# Patient Record
Sex: MALE | Race: WHITE | NOT HISPANIC OR LATINO | Employment: OTHER | URBAN - NONMETROPOLITAN AREA
[De-identification: names, ages, dates, MRNs, and addresses within clinical notes are randomized per-mention and may not be internally consistent; named-entity substitution may affect disease eponyms.]

---

## 2019-04-01 ENCOUNTER — HOSPITAL ENCOUNTER (EMERGENCY)
Facility: HOSPITAL | Age: 44
Discharge: HOME | End: 2019-04-02
Attending: FAMILY MEDICINE | Admitting: FAMILY MEDICINE

## 2019-04-01 DIAGNOSIS — K04.7 ABSCESSED TOOTH: Primary | ICD-10-CM

## 2019-04-01 PROCEDURE — 99282 EMERGENCY DEPT VISIT SF MDM: CPT | Mod: 25

## 2019-04-01 PROCEDURE — 64400 NJX AA&/STRD TRIGEMINAL NRV: CPT

## 2019-04-01 PROCEDURE — 64400 NJX AA&/STRD TRIGEMINAL NRV: CPT | Mod: Z6 | Performed by: FAMILY MEDICINE

## 2019-04-02 VITALS
HEART RATE: 66 BPM | DIASTOLIC BLOOD PRESSURE: 74 MMHG | TEMPERATURE: 97.4 F | OXYGEN SATURATION: 99 % | RESPIRATION RATE: 18 BRPM | SYSTOLIC BLOOD PRESSURE: 126 MMHG

## 2019-04-02 RX ORDER — CLINDAMYCIN HCL 150 MG
300 CAPSULE ORAL 3 TIMES DAILY
Qty: 60 CAPSULE | Refills: 0 | Status: SHIPPED | OUTPATIENT
Start: 2019-04-02 | End: 2022-08-09

## 2019-04-02 ASSESSMENT — ENCOUNTER SYMPTOMS
FATIGUE: 0
EYE REDNESS: 0
DIFFICULTY URINATING: 0
COLOR CHANGE: 0
HEADACHES: 0
CONFUSION: 0
SHORTNESS OF BREATH: 0
ARTHRALGIAS: 0
ABDOMINAL PAIN: 0
NECK STIFFNESS: 0

## 2019-04-02 NOTE — ED PROVIDER NOTES
"  History     Chief Complaint   Patient presents with     Dental Pain     HPI  Bernabe Garnica is a 43 year old man who presents with about 2 months of left lower dental pain that has increased over the last 3 days since he was eating and felt, \"like the chips I was chewing just collapsed the tooth inward.\" He reports consistent left lower tooth pain since that time. No fevers/chills. He took a single dose of ASA this afternoon, but nothing since that time.    Allergies:  No Known Allergies    Problem List:    There are no active problems to display for this patient.       Past Medical History:    No past medical history on file.    Past Surgical History:    No past surgical history on file.    Family History:    No family history on file.    Social History:  Marital Status:   [2]  Social History     Tobacco Use     Smoking status: Not on file   Substance Use Topics     Alcohol use: Not on file     Drug use: Not on file        Medications:      clindamycin (CLEOCIN) 150 MG capsule         Review of Systems   Constitutional: Negative for fatigue.   HENT: Negative for congestion.    Eyes: Negative for redness.   Respiratory: Negative for shortness of breath.    Cardiovascular: Negative for chest pain.   Gastrointestinal: Negative for abdominal pain.   Genitourinary: Negative for difficulty urinating.   Musculoskeletal: Negative for arthralgias and neck stiffness.   Skin: Negative for color change.   Neurological: Negative for headaches.   Psychiatric/Behavioral: Negative for confusion.       Physical Exam   BP: 121/70  Pulse: 63  Temp: 97.4  F (36.3  C)  Resp: 16  SpO2: 100 %      Physical Exam  General: awake, alert, sitting comfortably    Head: atraumatic.    Ears: no drainage, external ears normal.    Eyes: no injection or discharge, non-icteric.    Nose: no discharge or congestion.    Mouth/Throat: moist mucous membranes. Poor dentition with multiple missing teeth and multiple caries in remaining teeth. " Patient directs MD to abscessed tooth #18 as the source of his pain.    Neck: supple, full & painless ROM.    Lungs: no retractions or acute respiratory distress. No tachypnea.    Musculoskeletal/Extremities: full & painless ROM, no lower extremity edema or gross abnormality.    Skin: warm, dry, no cyanosis or rash.    Psych: alert & oriented x 3, fluid, logical thought & speech.      ED Course   Following verbal consent, 1.8 mL of 0.5% bupivacaine with epinephrine was used as a left inferior alveolar nerve block resulting in excellent analgesia. Patient tolerated procedure well.     Procedures                 No results found for this or any previous visit (from the past 24 hour(s)).    Medications - No data to display    Assessments & Plan (with Medical Decision Making)   The patient is a 43 year old man who presents with an abscessed tooth.    1) Abscessed tooth - patient will be treated with rest, alternating APAP/ibuprofen and a 10 day course of clindamycin to prevent further infection with plan for dentist follow-up as soon as possible regarding this ER visit. The patient verbalizes agreement with this plan as well as to immediately return to the ER with any new/worsening S/Sx.        I have reviewed the nursing notes.    I have reviewed the findings, diagnosis, plan and need for follow up with the patient.       Medication List      Started    clindamycin 150 MG capsule  Commonly known as:  CLEOCIN  300 mg, Oral, 3 TIMES DAILY            Final diagnoses:   Abscessed tooth       4/1/2019   [facility]     Christopher Haile MD  04/02/19 0023       Christopher Haile MD  04/02/19 0032

## 2019-04-02 NOTE — ED NOTES
Patient states he had been having dental pain on left lower side for a couple months.  Over the last 3 days, pain increased and he now has bleeding from his gums. States he hasn't been in to see a dentist and at this time, has no appointment scheduled.  Does state that tooth was previously filled and he has lost the filling and tooth also has back part of it missing.  States last thing he took was some aspirin this afternoon.

## 2019-04-02 NOTE — ED NOTES
Discharge instructions gone over with patient and he states understanding.  Patient is then discharged in stable condition, ambulatory.

## 2022-08-09 ENCOUNTER — NURSE TRIAGE (OUTPATIENT)
Dept: NURSING | Facility: CLINIC | Age: 47
End: 2022-08-09

## 2022-08-09 ENCOUNTER — HOSPITAL ENCOUNTER (EMERGENCY)
Facility: OTHER | Age: 47
Discharge: HOME | End: 2022-08-09
Attending: FAMILY MEDICINE | Admitting: FAMILY MEDICINE
Payer: COMMERCIAL

## 2022-08-09 VITALS
WEIGHT: 187 LBS | DIASTOLIC BLOOD PRESSURE: 86 MMHG | HEART RATE: 79 BPM | RESPIRATION RATE: 20 BRPM | OXYGEN SATURATION: 98 % | SYSTOLIC BLOOD PRESSURE: 135 MMHG | HEIGHT: 71 IN | TEMPERATURE: 98.9 F | BODY MASS INDEX: 26.18 KG/M2
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
## 2022-08-09 DIAGNOSIS — T14.8XXA PUNCTURE WOUND: ICD-10-CM

## 2022-08-09 PROCEDURE — 99283 EMERGENCY DEPT VISIT LOW MDM: CPT | Mod: 25 | Performed by: FAMILY MEDICINE

## 2022-08-09 PROCEDURE — 99283 EMERGENCY DEPT VISIT LOW MDM: CPT | Performed by: FAMILY MEDICINE

## 2022-08-09 PROCEDURE — 90471 IMMUNIZATION ADMIN: CPT | Performed by: FAMILY MEDICINE

## 2022-08-09 PROCEDURE — 250N000011 HC RX IP 250 OP 636: Performed by: FAMILY MEDICINE

## 2022-08-09 PROCEDURE — 90715 TDAP VACCINE 7 YRS/> IM: CPT | Performed by: FAMILY MEDICINE

## 2022-08-09 RX ORDER — CEPHALEXIN 500 MG/1
500 CAPSULE ORAL 4 TIMES DAILY
Qty: 28 CAPSULE | Refills: 0 | Status: SHIPPED | OUTPATIENT
Start: 2022-08-09 | End: 2022-08-16

## 2022-08-09 RX ADMIN — TETANUS TOXOID, REDUCED DIPHTHERIA TOXOID AND ACELLULAR PERTUSSIS VACCINE, ADSORBED 0.5 ML: 5; 2.5; 8; 8; 2.5 SUSPENSION INTRAMUSCULAR at 16:31

## 2022-08-09 ASSESSMENT — ACTIVITIES OF DAILY LIVING (ADL): ADLS_ACUITY_SCORE: 35

## 2022-08-09 NOTE — ED PROVIDER NOTES
"  History     Chief Complaint   Patient presents with     Snake Bite     HPI  Bernabe Garnica is a 46 year old male who presents emergency department after he was bitten by a nonvenomous snake.  Patient is well versed in the identification of a snake.  He bled a little bit at the time, only concerned about preventing infection.    Reviewed nurses notes below, similar history is related to me.  Patient presents to ER after a snake bite today by a red bellied eastern dailey snake on R index finger. 2 bites. Patient is anxious and fidgeting. Reports sore throat after bite and lightheadedness that has dissipated.  Concerned about infection.   Allergies:  Allergies   Allergen Reactions     Amoxicillin GI Disturbance       Problem List:    There are no problems to display for this patient.       Past Medical History:    No past medical history on file.    Past Surgical History:    No past surgical history on file.    Family History:    No family history on file.    Social History:  Marital Status:   [2]        Medications:    cephALEXin (KEFLEX) 500 MG capsule          Review of Systems   Constitutional: Negative for chills and fever.   Respiratory: Negative for chest tightness and shortness of breath.        Physical Exam   BP: 135/86  Pulse: 79  Temp: 98.9  F (37.2  C)  Resp: 20  Height: 180.3 cm (5' 11\")  Weight: 84.8 kg (187 lb)  SpO2: 98 %      Physical Exam  Vitals and nursing note reviewed.   HENT:      Head: Normocephalic.   Cardiovascular:      Rate and Rhythm: Normal rate.   Pulmonary:      Effort: No respiratory distress.      Breath sounds: No wheezing or rales.   Musculoskeletal:      Cervical back: Normal range of motion.       2 small puncture wounds, hemostatic  ED Course          No results found for this or any previous visit (from the past 24 hour(s)).    Medications   Tdap (tetanus-diptheria-acell pertussis) (BOOSTRIX) injection ADAM 0.5 mL (0.5 mLs Intramuscular Given 8/9/22 6452) "       Assessments & Plan (with Medical Decision Making)     I have reviewed the nursing notes.    I have reviewed the findings, diagnosis, plan and need for follow up with the patient.  I am not concerned about envenomation, we will treat this just like a single puncture wound, tetanus updated, prophylactic antibiotics.  Return to ED with signs of infection such as fever, worsening pain, discharge or swelling.  Patient verbalized understanding plans agreement left ED improved condition.    New Prescriptions    CEPHALEXIN (KEFLEX) 500 MG CAPSULE    Take 1 capsule (500 mg) by mouth 4 times daily for 7 days       Final diagnoses:   Puncture wound   Snake bite, initial encounter       8/9/2022   [facility]  08/10/22 6170

## 2022-08-09 NOTE — TELEPHONE ENCOUNTER
"Wife calling - patient was bit by a red-sided dailey snake about 30 min ago    2 puncture holes on right index knuckle and 1/2in higher on index finger    Patient tried to let the bite bleed a bit and then cleaned it with hydrogen peroxide.    No breathing difficulty or swelling of airway. States he is dehydrated and has \"cotton mouth\". Advised getting out of heat and hydrating.    No redness, no swelling, no fever.  Advised patient clean the area with soap and water and apply topical antibiotic. Call back with any increased pain, swelling, redness, difficulty breathing, fever, etc.    Patient verbalized understanding and agreed with plan.     Amy Aponte RN  08/09/22 3:10 PM  [facility] Nurse Advisor      Reason for Disposition    Non-serious spider bite    Additional Information    Negative: Difficulty breathing or swallowing    Negative: Difficult to awaken or acting confused (e.g., disoriented, slurred speech)    Negative: Pale cold skin and very weak (can't stand)    Negative: Sounds like a life-threatening emergency to the triager    Negative: Not a spider bite    Negative: Black  (or brown ) spider bite and local skin changes    Negative: Abdominal pain, chest tightness, or other muscle cramps    Negative: Urine is brown, black, or red in color    Negative: Vomiting    Negative: Patient sounds very sick or weak to the triager    Negative: SEVERE bite pain and not improved after 2 hours of pain medicine    Negative: Rash elsewhere on body that developed after spider bite    Negative: Fever and red area    Negative: Fever and area is very tender to touch    Negative: Red streak or red line and length > 2 inches (5 cm)    Negative: Red or very tender (to touch) area, and started over 24 hours after the bite    Negative: Red or very tender (to touch) area, getting larger over 48 hours after the bite    Negative: Eye irritation after handling or touching a tarantula    Negative: Has diabetes " mellitus with spider bite wound on foot    Negative: No prior tetanus shots (or is not fully vaccinated)    Negative: Patient wants to be seen    Negative: Scab drains pus or increases in size, and not improved after applying antibiotic ointment for 2 days    Negative: Bite starts to look bad (e.g., blister, purplish skin, ulcer)    Negative: Last tetanus shot > 10 years ago    Protocols used: SPIDER BITE [facility]

## 2022-08-09 NOTE — ED TRIAGE NOTES
"Patient presents to ER after a snake bite today by a red bellied eastern dailey snake on R index finger. 2 bites. Patient is anxious and fidgeting. Reports sore throat after bite and lightheadedness that has dissipated.  Concerned about infection.  /86   Pulse 79   Temp 98.9  F (37.2  C) (Temporal)   Resp 20   Ht 1.803 m (5' 11\")   Wt 84.8 kg (187 lb)   BMI 26.08 kg/m         Triage Assessment     Row Name 08/09/22 4329       Triage Assessment (Adult)    Airway WDL WDL       Respiratory WDL    Respiratory WDL WDL       Skin Circulation/Temperature WDL    Skin Circulation/Temperature WDL WDL       Cardiac WDL    Cardiac WDL WDL       Peripheral/Neurovascular WDL    Peripheral Neurovascular WDL WDL    Capillary Refill, General less than/equal to 3 secs       Neil Coma Scale    Best Eye Response 4-->(E4) spontaneous    Best Motor Response 6-->(M6) obeys commands    Best Verbal Response 5-->(V5) oriented    Neil Coma Scale Score 15              "

## 2022-08-10 ASSESSMENT — ENCOUNTER SYMPTOMS
CHEST TIGHTNESS: 0
SHORTNESS OF BREATH: 0
FEVER: 0
CHILLS: 0

## 2022-08-23 ENCOUNTER — OFFICE VISIT (OUTPATIENT)
Dept: FAMILY MEDICINE | Facility: OTHER | Age: 47
End: 2022-08-23
Attending: FAMILY MEDICINE
Payer: COMMERCIAL

## 2022-08-23 ENCOUNTER — TELEPHONE (OUTPATIENT)
Dept: FAMILY MEDICINE | Facility: OTHER | Age: 47
End: 2022-08-23

## 2022-08-23 VITALS
TEMPERATURE: 96.9 F | WEIGHT: 190.8 LBS | DIASTOLIC BLOOD PRESSURE: 78 MMHG | HEIGHT: 71 IN | SYSTOLIC BLOOD PRESSURE: 120 MMHG | HEART RATE: 62 BPM | RESPIRATION RATE: 18 BRPM | BODY MASS INDEX: 26.71 KG/M2 | OXYGEN SATURATION: 97 %

## 2022-08-23 DIAGNOSIS — F50.89 ATYPICAL EATING DISORDER: ICD-10-CM

## 2022-08-23 DIAGNOSIS — Z02.89 HEALTH EXAMINATION OF DEFINED SUBPOPULATION: Primary | ICD-10-CM

## 2022-08-23 DIAGNOSIS — F10.11 HISTORY OF ALCOHOL ABUSE: ICD-10-CM

## 2022-08-23 DIAGNOSIS — F11.11 HX OF OPIOID ABUSE (H): ICD-10-CM

## 2022-08-23 DIAGNOSIS — M17.0 PRIMARY OSTEOARTHRITIS OF BOTH KNEES: ICD-10-CM

## 2022-08-23 LAB
CHOLEST SERPL-MCNC: 239 MG/DL
FASTING STATUS PATIENT QL REPORTED: NO
HDLC SERPL-MCNC: 40 MG/DL (ref 23–92)
LDLC SERPL CALC-MCNC: 150 MG/DL
NONHDLC SERPL-MCNC: 199 MG/DL
TRIGL SERPL-MCNC: 245 MG/DL

## 2022-08-23 PROCEDURE — 80061 LIPID PANEL: CPT | Mod: ZL | Performed by: FAMILY MEDICINE

## 2022-08-23 PROCEDURE — 99386 PREV VISIT NEW AGE 40-64: CPT | Performed by: FAMILY MEDICINE

## 2022-08-23 PROCEDURE — 36415 COLL VENOUS BLD VENIPUNCTURE: CPT | Mod: ZL | Performed by: FAMILY MEDICINE

## 2022-08-23 ASSESSMENT — ENCOUNTER SYMPTOMS
MYALGIAS: 1
SORE THROAT: 0
ARTHRALGIAS: 1
FREQUENCY: 1
SHORTNESS OF BREATH: 1
HEMATURIA: 0
HEARTBURN: 1
WEAKNESS: 1
DIZZINESS: 1
HEADACHES: 1
NAUSEA: 1
PARESTHESIAS: 1
ABDOMINAL PAIN: 0
JOINT SWELLING: 1
DYSURIA: 0
NERVOUS/ANXIOUS: 1
FEVER: 0
PALPITATIONS: 0
COUGH: 0
CONSTIPATION: 0
CHILLS: 0
DIARRHEA: 0
HEMATOCHEZIA: 0

## 2022-08-23 ASSESSMENT — PAIN SCALES - GENERAL: PAINLEVEL: SEVERE PAIN (7)

## 2022-08-23 ASSESSMENT — ANXIETY QUESTIONNAIRES
GAD7 TOTAL SCORE: 0
5. BEING SO RESTLESS THAT IT IS HARD TO SIT STILL: NOT AT ALL
7. FEELING AFRAID AS IF SOMETHING AWFUL MIGHT HAPPEN: NOT AT ALL
GAD7 TOTAL SCORE: 0
1. FEELING NERVOUS, ANXIOUS, OR ON EDGE: NOT AT ALL
3. WORRYING TOO MUCH ABOUT DIFFERENT THINGS: NOT AT ALL
6. BECOMING EASILY ANNOYED OR IRRITABLE: NOT AT ALL
2. NOT BEING ABLE TO STOP OR CONTROL WORRYING: NOT AT ALL

## 2022-08-23 ASSESSMENT — PATIENT HEALTH QUESTIONNAIRE - PHQ9: 5. POOR APPETITE OR OVEREATING: NOT AT ALL

## 2022-08-23 NOTE — TELEPHONE ENCOUNTER
Patient states he forgot to talk to Dr. Leon about pus in his tonsils. Please call.    Kenzie Pratt on 8/23/2022 at 1:28 PM

## 2022-08-23 NOTE — LETTER
August 24, 2022      Bernabe Garnica  [address] [facility] [address]        Dear ,    We are writing to inform you of your test results.    Your test results fall within the expected range(s) or remain unchanged from previous results.  Please continue with current treatment plan.   The 10-year ASCVD risk score (Katheryn JAMA Jr., et al., 2013) is: 4.2%    Values used to calculate the score:      Age: 46 years      Sex: Male      Is Non- : No      Diabetic: No      Tobacco smoker: No      Systolic Blood Pressure: 120 mmHg      Is BP treated: No      HDL Cholesterol: 40 mg/dL      Total Cholesterol: 239 mg/dL      Resulted Orders   Lipid Panel   Result Value Ref Range    Cholesterol 239 (H) <200 mg/dL    Triglycerides 245 (H) <150 mg/dL    Direct Measure HDL 40 23 - 92 mg/dL    LDL Cholesterol Calculated 150 (H) <=100 mg/dL    Non HDL Cholesterol 199 (H) <130 mg/dL    Patient Fasting > 8hrs? No     Narrative    Cholesterol  Desirable:  <200 mg/dL    Triglycerides  Normal:  Less than 150 mg/dL  Borderline High:  150-199 mg/dL  High:  200-499 mg/dL  Very High:  Greater than or equal to 500 mg/dL    Direct Measure HDL  Female:  Greater than or equal to 50 mg/dL   Male:  Greater than or equal to 40 mg/dL    LDL Cholesterol  Desirable:  <100mg/dL  Above Desirable:  100-129 mg/dL   Borderline High:  130-159 mg/dL   High:  160-189 mg/dL   Very High:  >= 190 mg/dL    Non HDL Cholesterol  Desirable:  130 mg/dL  Above Desirable:  130-159 mg/dL  Borderline High:  160-189 mg/dL  High:  190-219 mg/dL  Very High:  Greater than or equal to 220 mg/dL       If you have any questions or concerns, please call the clinic at the number listed above.       Sincerely,      Greg Leon MD

## 2022-08-23 NOTE — NURSING NOTE
Patient here for physical, last eye exam 2003 and last dental exam 2021. Medication Reconciliation: complete.    Missy Dhillon LPN  8/23/2022 10:09 AM

## 2022-08-23 NOTE — TELEPHONE ENCOUNTER
He says the area  Is shrinking and no pain, no fever, he said he slept with his mouth open and it started to hurt. He did salt water gargles and it took the sting out it along with using Dayquil. It started about 2 weeks ago. Please advise if he should continue what he has been doing. Missy Dhillon LPN .......................8/23/2022  4:06 PM

## 2022-08-23 NOTE — PROGRESS NOTES
"  Assessment & Plan     Health examination of defined subpopulation    - Lipid Panel; Future  - Lipid Panel    Atypical eating disorder      Primary osteoarthritis of both knees      Hx of opioid abuse (H)  Currently doing well in remission    History of alcohol abuse  Currently doing well in remission    We will get back to patient when the labs with return.           BMI:   Estimated body mass index is 26.99 kg/m  as calculated from the following:    Height as of this encounter: 1.791 m (5' 10.5\").    Weight as of this encounter: 86.5 kg (190 lb 12.8 oz).           No follow-ups on file.    Greg Leon MD  [facility] AND [facility]    Subjective   Bernabe is a 46 year old, presenting for the following health issues:  Physical      Patient arrives here for physical patient [facility] the [facility] has been [facility].  He reports he has been in the  for 15 years.  He had difficulty in the  with alcohol and opiates.  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  He reports shoulder pain.  He reports pain 7 out of 10.  He states that his 100% disabled.  His past medical history is updated.    Healthy Habits:     Getting at least 3 servings of Calcium per day:  NO    Bi-annual eye exam:  NO    Dental care twice a year:  NO    Sleep apnea or symptoms of sleep apnea:  None    Diet:  Other    Frequency of exercise:  1 day/week    Duration of exercise:  15-30 minutes    Taking medications regularly:  No    Barriers to taking medications:  Other    Medication side effects:  Other    PHQ-2 Total Score: 0    Additional concerns today:  No             Review of Systems         Objective    /78   Pulse 62   Temp 96.9  F (36.1  C)   Resp 18   Ht 1.791 m (5' 10.5\")   Wt 86.5 kg (190 lb 12.8 oz)   SpO2 97%   BMI 26.99 kg/m    Body mass index is 26.99 kg/m .  Physical Exam  Constitutional:       Appearance: " Normal appearance.   HENT:      Head: Normocephalic.      Mouth/Throat:      Mouth: Mucous membranes are moist.   Cardiovascular:      Rate and Rhythm: Normal rate and regular rhythm.   Abdominal:      General: Abdomen is flat.   Skin:     General: Skin is warm.   Neurological:      General: No focal deficit present.      Mental Status: He is alert.   Psychiatric:         Mood and Affect: Mood normal.         Thought Content: Thought content normal.                            .  ..

## 2022-09-26 ENCOUNTER — OFFICE VISIT (OUTPATIENT)
Dept: FAMILY MEDICINE | Facility: OTHER | Age: 47
End: 2022-09-26
Attending: FAMILY MEDICINE
Payer: COMMERCIAL

## 2022-09-26 VITALS
WEIGHT: 191.2 LBS | OXYGEN SATURATION: 98 % | RESPIRATION RATE: 18 BRPM | DIASTOLIC BLOOD PRESSURE: 72 MMHG | BODY MASS INDEX: 27.05 KG/M2 | TEMPERATURE: 97.3 F | HEART RATE: 62 BPM | SYSTOLIC BLOOD PRESSURE: 114 MMHG

## 2022-09-26 DIAGNOSIS — Z53.20 COLON CANCER SCREENING DECLINED: ICD-10-CM

## 2022-09-26 DIAGNOSIS — K21.9 GASTROESOPHAGEAL REFLUX DISEASE WITHOUT ESOPHAGITIS: ICD-10-CM

## 2022-09-26 DIAGNOSIS — J31.2 CHRONIC SORE THROAT: ICD-10-CM

## 2022-09-26 PROCEDURE — 99213 OFFICE O/P EST LOW 20 MIN: CPT | Performed by: FAMILY MEDICINE

## 2022-09-26 RX ORDER — OMEPRAZOLE 40 MG/1
40 CAPSULE, DELAYED RELEASE ORAL DAILY
Qty: 90 CAPSULE | Refills: 0 | Status: SHIPPED | OUTPATIENT
Start: 2022-09-26 | End: 2023-01-06

## 2022-09-26 ASSESSMENT — ANXIETY QUESTIONNAIRES
7. FEELING AFRAID AS IF SOMETHING AWFUL MIGHT HAPPEN: NOT AT ALL
2. NOT BEING ABLE TO STOP OR CONTROL WORRYING: NOT AT ALL
GAD7 TOTAL SCORE: 0
6. BECOMING EASILY ANNOYED OR IRRITABLE: NOT AT ALL
8. IF YOU CHECKED OFF ANY PROBLEMS, HOW DIFFICULT HAVE THESE MADE IT FOR YOU TO DO YOUR WORK, TAKE CARE OF THINGS AT HOME, OR GET ALONG WITH OTHER PEOPLE?: NOT DIFFICULT AT ALL
1. FEELING NERVOUS, ANXIOUS, OR ON EDGE: NOT AT ALL
IF YOU CHECKED OFF ANY PROBLEMS ON THIS QUESTIONNAIRE, HOW DIFFICULT HAVE THESE PROBLEMS MADE IT FOR YOU TO DO YOUR WORK, TAKE CARE OF THINGS AT HOME, OR GET ALONG WITH OTHER PEOPLE: NOT DIFFICULT AT ALL
3. WORRYING TOO MUCH ABOUT DIFFERENT THINGS: NOT AT ALL
5. BEING SO RESTLESS THAT IT IS HARD TO SIT STILL: NOT AT ALL
7. FEELING AFRAID AS IF SOMETHING AWFUL MIGHT HAPPEN: NOT AT ALL
GAD7 TOTAL SCORE: 0
GAD7 TOTAL SCORE: 0
4. TROUBLE RELAXING: NOT AT ALL

## 2022-09-26 ASSESSMENT — PATIENT HEALTH QUESTIONNAIRE - PHQ9
SUM OF ALL RESPONSES TO PHQ QUESTIONS 1-9: 3
SUM OF ALL RESPONSES TO PHQ QUESTIONS 1-9: 3

## 2022-09-26 ASSESSMENT — PAIN SCALES - GENERAL: PAINLEVEL: NO PAIN (1)

## 2022-09-26 NOTE — PROGRESS NOTES
"  Assessment & Plan     Chronic sore throat  I suspect is GERD we will start  - omeprazole (PRILOSEC) 40 MG DR capsule; Take 1 capsule (40 mg) by mouth daily    Gastroesophageal reflux disease without esophagitis    - omeprazole (PRILOSEC) 40 MG DR capsule; Take 1 capsule (40 mg) by mouth daily    Colon cancer screening declined  Declined colon cancer screening and Cologuard    Also discussed his dizziness.  If worsening follow-up in clinic           BMI:   Estimated body mass index is 27.05 kg/m  as calculated from the following:    Height as of 8/23/22: 1.791 m (5' 10.5\").    Weight as of this encounter: 86.7 kg (191 lb 3.2 oz).           No follow-ups on file.    Greg Leon MD  [facility]    Greyson Kidd is a 47 year old, presenting for the following health issues:  Throat Problem      Patient arrives here with chronic sore throat.  He states he has been having heartburn on and off for years.  Recently he developed a sore throat.  Started in August.  Has been using Tums with maybe little relief.  He also has concerns about dizziness.  He states he was on a fairly restricted diet.  But did finally admit and the dizziness has improved.  Lightheaded.  He did not check his pulse.  Or blood pressure.  Dizziness is not present all the time.  After starting to eat meat again seem to improve    History of Present Illness       Reason for visit:  Pain in throat along with some heartburn    He eats 0-1 servings of fruits and vegetables daily.He consumes 2 sweetened beverage(s) daily.He exercises with enough effort to increase his heart rate 9 or less minutes per day.  He exercises with enough effort to increase his heart rate 3 or less days per week.   He is taking medications regularly.    Today's PHQ-9         PHQ-9 Total Score: 3    PHQ-9 Q9 Thoughts of better off dead/self-harm past 2 weeks :   Not at all      Today's MITCHEL-7 Score: 0             Review of Systems         Objective  "   /72   Pulse 62   Temp 97.3  F (36.3  C)   Resp 18   Wt 86.7 kg (191 lb 3.2 oz)   SpO2 98%   BMI 27.05 kg/m    Body mass index is 27.05 kg/m .  Physical Exam  Constitutional:       Appearance: Normal appearance.   HENT:      Right Ear: Tympanic membrane normal.      Left Ear: Tympanic membrane normal.      Mouth/Throat:      Mouth: Mucous membranes are moist.      Pharynx: No oropharyngeal exudate or posterior oropharyngeal erythema.      Comments: Palpation of the floor the mouth did not reveal any masses.  Cardiovascular:      Rate and Rhythm: Normal rate and regular rhythm.      Pulses: Normal pulses.   Pulmonary:      Effort: Pulmonary effort is normal.      Breath sounds: Normal breath sounds.   Neurological:      Mental Status: He is alert.

## 2022-09-26 NOTE — NURSING NOTE
Patient here to have his throat checked. Since August 2022 he has had a persistent soreness. Medication Reconciliation: complete.    Missy Dhillon LPN  9/26/2022 10:52 AM  
Spine appears normal, range of motion is not limited, no muscle or joint tenderness

## 2023-01-03 DIAGNOSIS — K21.9 GASTROESOPHAGEAL REFLUX DISEASE WITHOUT ESOPHAGITIS: ICD-10-CM

## 2023-01-03 DIAGNOSIS — J31.2 CHRONIC SORE THROAT: ICD-10-CM

## 2023-01-06 RX ORDER — OMEPRAZOLE 40 MG/1
40 CAPSULE, DELAYED RELEASE ORAL DAILY
Qty: 90 CAPSULE | Refills: 0 | Status: SHIPPED | OUTPATIENT
Start: 2023-01-06

## 2023-01-06 NOTE — TELEPHONE ENCOUNTER
"  Last Prescription Date: 9/26/22  Last Qty/Refills: 90 / 0  Last Office Visit: 9/26/22  Future Office Visit: none noted     Requested Prescriptions   Pending Prescriptions Disp Refills     omeprazole (PRILOSEC) 40 MG DR capsule [Pharmacy Med Name: OMEPRAZOLE 40MG DR CAPSULE] 90 capsule 0     Sig: TAKE 1 CAPSULE (40 MG) BY MOUTH DAILY       PPI Protocol Passed - 1/3/2023 11:57 AM        Passed - Not on Clopidogrel (unless Pantoprazole ordered)        Passed - No diagnosis of osteoporosis on record        Passed - Recent (12 mo) or future (30 days) visit within the authorizing provider's specialty     Patient has had an office visit with the authorizing provider or a provider within the authorizing providers department within the previous 12 mos or has a future within next 30 days. See \"Patient Info\" tab in inbasket, or \"Choose Columns\" in Meds & Orders section of the refill encounter.          Passed - Medication is active on med list        Passed - Patient is age 18 or older   Lisa Fuentes RN ....................  1/6/2023   9:09 AM          "

## 2023-08-28 ENCOUNTER — HOSPITAL ENCOUNTER (EMERGENCY)
Facility: OTHER | Age: 48
Discharge: HOME | End: 2023-08-28
Payer: COMMERCIAL

## 2023-08-28 VITALS
RESPIRATION RATE: 16 BRPM | DIASTOLIC BLOOD PRESSURE: 75 MMHG | WEIGHT: 191 LBS | BODY MASS INDEX: 26.74 KG/M2 | TEMPERATURE: 98.2 F | HEIGHT: 71 IN | SYSTOLIC BLOOD PRESSURE: 135 MMHG | HEART RATE: 62 BPM | OXYGEN SATURATION: 96 %

## 2023-08-28 NOTE — ED TRIAGE NOTES
Pt presents to ED with c/o bright red blood in his stool. Pt is on malaria medication from [place]. Pt reports diarrhea side effect from that. Pt c/o feeling ligh headed. Pt has sinus pressure as well. Bleeding X2 days.    Fay Workman RN on 8/28/2023 at 1:18 PM       Triage Assessment       Row Name 08/28/23 1316       Skin Circulation/Temperature WDL    Skin Circulation/Temperature WDL WDL                    
no concerns

## 2024-01-08 ENCOUNTER — HOSPITAL ENCOUNTER (EMERGENCY)
Facility: OTHER | Age: 49
Discharge: HOME | End: 2024-01-08
Attending: EMERGENCY MEDICINE | Admitting: EMERGENCY MEDICINE
Payer: COMMERCIAL

## 2024-01-08 VITALS
HEART RATE: 65 BPM | DIASTOLIC BLOOD PRESSURE: 83 MMHG | HEIGHT: 71 IN | TEMPERATURE: 98.8 F | WEIGHT: 191 LBS | RESPIRATION RATE: 18 BRPM | OXYGEN SATURATION: 96 % | BODY MASS INDEX: 26.74 KG/M2 | SYSTOLIC BLOOD PRESSURE: 133 MMHG

## 2024-01-08 DIAGNOSIS — J02.0 ACUTE STREPTOCOCCAL PHARYNGITIS: ICD-10-CM

## 2024-01-08 LAB
FLUAV RNA SPEC QL NAA+PROBE: NEGATIVE
FLUBV RNA RESP QL NAA+PROBE: NEGATIVE
GROUP A STREP BY PCR: DETECTED
RSV RNA SPEC NAA+PROBE: NEGATIVE
SARS-COV-2 RNA RESP QL NAA+PROBE: NEGATIVE

## 2024-01-08 PROCEDURE — 87637 SARSCOV2&INF A&B&RSV AMP PRB: CPT | Performed by: EMERGENCY MEDICINE

## 2024-01-08 PROCEDURE — 87651 STREP A DNA AMP PROBE: CPT | Performed by: EMERGENCY MEDICINE

## 2024-01-08 PROCEDURE — 99283 EMERGENCY DEPT VISIT LOW MDM: CPT | Performed by: EMERGENCY MEDICINE

## 2024-01-08 RX ORDER — CEFUROXIME AXETIL 250 MG/1
250 TABLET ORAL 2 TIMES DAILY
Qty: 20 TABLET | Refills: 0 | Status: SHIPPED | OUTPATIENT
Start: 2024-01-08 | End: 2024-01-18

## 2024-01-08 ASSESSMENT — ACTIVITIES OF DAILY LIVING (ADL): ADLS_ACUITY_SCORE: 33

## 2024-01-08 NOTE — ED PROVIDER NOTES
"[facility]  Emergency Department Visit Note    Pharyngitis      History of Present Illness     HPI:  Bernabe Garnica is a 48 year old male presenting with sore throat. These symptoms started 1 days ago and have progressively worsened. The patient has been exposed to sick contacts with similar symptoms. . The patient is able to tolerate fluids but has pain with swallowing. No pain with breathing, shortness of breath, or sensation of restricted  Breathing. No change in voice. Vaccinations are up to date. He has tried tylenol without significant relief of symptoms.    Medications:  Prior to Admission medications    Medication Sig Last Dose Taking? Auth Provider Long Term End Date   cefuroxime (CEFTIN) 250 MG tablet Take 1 tablet (250 mg) by mouth 2 times daily for 10 days  Yes Estefany Stone MD  1/18/24   omeprazole (PRILOSEC) 40 MG DR capsule TAKE 1 CAPSULE (40 MG) BY MOUTH DAILY More than a month Yes Greg Leon MD         Allergies:  Allergies   Allergen Reactions    Amoxicillin GI Disturbance       Problem List:  Patient Active Problem List   Diagnosis    Atypical eating disorder    Primary osteoarthritis of both knees    Hx of opioid abuse (H)    History of alcohol abuse    Chronic sore throat    Gastroesophageal reflux disease without esophagitis    Colon cancer screening declined       Past Medical History:  No past medical history on file.    Past Surgical History:  No past surgical history on file.    Social History:  Social History     Tobacco Use    Smoking status: Former    Smokeless tobacco: Never   Vaping Use    Vaping Use: Never used   Substance Use Topics    Alcohol use: Yes     Comment: less than weekly    Drug use: Never       Review of Systems:  See HPI      Physical Exam     Vital signs: /83   Pulse 65   Temp 98.8  F (37.1  C) (Tympanic)   Resp 18   Ht 1.791 m (5' 10.5\")   Wt 86.6 kg (191 lb)   SpO2 96%   BMI 27.02 kg/m      Physical Exam:    General: " awake and alert, comfortable  HEENT: atraumatic, no scleral injection, no nasal discharge, neck supple with tenderness of bilateral neck below the mandible and no tenderness with manipulation of the cricoid or thyroid cartilages, oropharynx notable for erythema and tonsils 2* without exudates. Trismus is not present. Muffled voice is not present. Uvular shift is not present.   Chest: clear to auscultation bilaterally without wheezes or crackles, non labored respirations, symmetric chest rise  Cardiovascular: regular rate and rhythm, no murmurs or gallops  Abdomen: soft, nontender, no rebound or guarding, nondistended  Extremities: no deformities, edema, or tenderness  Skin: warm, dry, no rashes  Neuro: alert and oriented x 3, moving extremities x 4, ambulates without difficulty    Medical Decision Making & ED Course     Bernabe Garnica is a 48 year old male presenting with sore throat. Differential includes viral pharyngitis, streptococcal pharyngitis, peritonsillar abscess, retropharyngeal abscess, epiglottitis, allergy, gastroesophageal reflux disease. With the constellation of symptoms and exam findings in context of the [employer] criteria, a rapid streptococcal antigen test was positive. Given exam findings, this is unlikely to be a peritonsillar or retropharyngeal abscess or epiglottitis. Dexamethasone was not given for symptomatic relief.  Patient likely has strep pharyngitis as etiology of his sore throat and will prescribe ceftin for outpatient therapy. Return to the ER with increased pain, inability to swallow fluids, fever, rash or any concerns. Follow up with a primary care provider if symptoms do not improve within the next 2-4 days. All questions were answered and the patient is comfortable with plan for discharge. The patient was discharged in stable condition.    I have reviewed the patients laboratory studies and medical records.  .    Diagnosis & Disposition     Diagnosis:  1. Acute streptococcal  pharyngitis        Disposition:  Home    MD Chase Mandel Theresa M, MD  01/08/24 3679

## 2024-01-08 NOTE — ED TRIAGE NOTES
"Pt presents to ED with c/o sore throat \"with pss pockets\". Pt does not know if he has exposure to strep. Pt is afebrile.    Fay Workman RN on 1/8/2024 at 3:57 PM       Triage Assessment (Adult)       Row Name 01/08/24 1556          Respiratory WDL    Respiratory WDL WDL        Peripheral/Neurovascular WDL    Peripheral Neurovascular WDL WDL        Cognitive/Neuro/Behavioral WDL    Cognitive/Neuro/Behavioral WDL WDL                     "

## 2024-02-10 ENCOUNTER — HOSPITAL ENCOUNTER (EMERGENCY)
Facility: OTHER | Age: 49
End: 2024-02-10

## 2024-02-11 ENCOUNTER — HOSPITAL ENCOUNTER (EMERGENCY)
Facility: OTHER | Age: 49
Discharge: HOME | End: 2024-02-11
Attending: INTERNAL MEDICINE | Admitting: INTERNAL MEDICINE
Payer: COMMERCIAL

## 2024-02-11 VITALS
OXYGEN SATURATION: 97 % | BODY MASS INDEX: 24.5 KG/M2 | WEIGHT: 175 LBS | TEMPERATURE: 98.6 F | RESPIRATION RATE: 18 BRPM | HEIGHT: 71 IN | DIASTOLIC BLOOD PRESSURE: 59 MMHG | HEART RATE: 78 BPM | SYSTOLIC BLOOD PRESSURE: 121 MMHG

## 2024-02-11 DIAGNOSIS — J02.9 ACUTE PHARYNGITIS, UNSPECIFIED ETIOLOGY: ICD-10-CM

## 2024-02-11 LAB — GROUP A STREP BY PCR: NOT DETECTED

## 2024-02-11 PROCEDURE — 99283 EMERGENCY DEPT VISIT LOW MDM: CPT | Performed by: INTERNAL MEDICINE

## 2024-02-11 PROCEDURE — 87651 STREP A DNA AMP PROBE: CPT | Performed by: INTERNAL MEDICINE

## 2024-02-11 RX ORDER — CEPHALEXIN 500 MG/1
500 CAPSULE ORAL 2 TIMES DAILY
Qty: 14 CAPSULE | Refills: 0 | Status: SHIPPED | OUTPATIENT
Start: 2024-02-11 | End: 2024-02-18

## 2024-02-11 ASSESSMENT — ACTIVITIES OF DAILY LIVING (ADL): ADLS_ACUITY_SCORE: 35

## 2024-02-11 NOTE — ED TRIAGE NOTES
"Patient drove self today with complaints of sore throat on right side that's been going on for the past 4 days. Throat red and dry. Patient not on medications. No fever, nausea or vomiting.  /59   Pulse 78   Temp 98.6  F (37  C) (Tympanic)   Resp 18   Ht 1.803 m (5' 11\")   Wt 79.4 kg (175 lb)   SpO2 97%   BMI 24.41 kg/m    Radha Elder RN on 2/11/2024 at 6:30 AM           Triage Assessment (Adult)       Row Name 02/11/24 0627          Triage Assessment    Airway WDL WDL        Respiratory WDL    Respiratory WDL WDL        Skin Circulation/Temperature WDL    Skin Circulation/Temperature WDL WDL        Cardiac WDL    Cardiac WDL WDL        Peripheral/Neurovascular WDL    Peripheral Neurovascular WDL WDL        Cognitive/Neuro/Behavioral WDL    Cognitive/Neuro/Behavioral WDL WDL                     "

## 2024-02-11 NOTE — ED PROVIDER NOTES
Emergency Department Provider Note  : 1975 Age: 48 year old Sex: male MRN: [id number]    Chief Complaint   Patient presents with    Pharyngitis       Medical Decision Making / Assessment / Plan   48 year old male presenting with     ED Course as of 24 0722   Sun 2024   0653 Patient evaluated.  On 4-day history of nearly identical symptoms from  when he tested positive for strep group A.  Strep swab collected/pending.   0716 Group A strep swab negative.  Given his acute pharyngitis, still highly suspect bacterial infection.    Prescription for Keflex sent to pharmacy.  Follow-up as needed for new or worsening symptoms.        A shared decision making model was used. Plan and all results were discussed  Time was taken to answer all questions. Patient and/or associated parties understood and were agreeable to treatment plan.  Strict return to Emergency Department precautions as well as appropriate follow up instructions were provided. The patient was discharged in stable condition.    New Prescriptions    CEPHALEXIN (KEFLEX) 500 MG CAPSULE    Take 1 capsule (500 mg) by mouth 2 times daily for 7 days       Final diagnoses:   Acute pharyngitis, unspecified etiology       Gab Ruffin MD  2024   Emergency Department    Subjective   Bernabe is a 48 year old male who presents at  6:32 AM with 4-day history of sore throat.  States that he presented with identical symptoms compared to back in January when he had group A positive strep swab.  He did change his toothbrush.    The last 4 days has been developing increasing tenderness in the left submandibular gland.  His throat is raw and erythematous, no obvious white patches or exudates.  Reports his throat is quite dry and irritated.  Fairly constant pain bilaterally.    No drooling.  No rash    I have reviewed the Medications, Allergies, Past Medical and Surgical History, and Social History in the [employer] [facility] and with family.    Review of  "Systems:  Please see Subjective / HPI for pertinent positives and negatives. All other systems reviewed and found to be negative.      Objective   Patient Vitals for the past 24 hrs:   BP Temp Temp src Pulse Resp SpO2 Height Weight   02/11/24 0626 121/59 98.6  F (37  C) Tympanic 78 18 97 % 1.803 m (5' 11\") 79.4 kg (175 lb)       Physical Exam:     General: Awake, alert, in no acute respiratory distress.  Head: Normocephalic, atraumatic.  Eyes: Conjugate gaze.  ENT: Moist membranes, external ear appears normal.  Throat is quite raw, and irritated.  Erythema.  No obvious exudates.  No trismus.  Chest/Respiratory: Equal chest rise, nonlabored.  Cardiovascular: regular rate and rhythm .  Neurological: GCS 15, extremities without gross deficit.  Skin: Warm, no rashes, lesions, or bruising.   Psychiatric: Appropriate affect.     Procedures / Critical Care   Procedures    Aggregate Critical Care Time: None.       Orders Placed This Encounter   Procedures    Group A Streptococcus PCR Throat Swab       RESULTS: As noted above.          Medical/Surgical History:  No past medical history on file.  No past surgical history on file.    Medications:  No current facility-administered medications for this encounter.     Current Outpatient Medications   Medication    cephALEXin (KEFLEX) 500 MG capsule    omeprazole (PRILOSEC) 40 MG DR capsule       Allergies:  Amoxicillin    Relevant labs, images, EKGs, Epic and outside hospital (if applicable) charts were reviewed. The findings, diagnosis, plan, and need for follow up were discussed with the patient/family. Nursing notes were reviewed.      Gab Ruffin MD  02/11/24 0722    "

## 2024-02-11 NOTE — DISCHARGE INSTRUCTIONS
Acute pharyngitis, unspecified etiology    Start Keflex 500 mg twice daily for 7 days.    Okay to take Tylenol and ibuprofen as needed for pain or fever.    Return as needed for new or worsening symptoms.

## 2024-05-22 ENCOUNTER — HOSPITAL ENCOUNTER (EMERGENCY)
Facility: OTHER | Age: 49
Discharge: HOME | End: 2024-05-22
Attending: STUDENT IN AN ORGANIZED HEALTH CARE EDUCATION/TRAINING PROGRAM | Admitting: STUDENT IN AN ORGANIZED HEALTH CARE EDUCATION/TRAINING PROGRAM
Payer: COMMERCIAL

## 2024-05-22 VITALS
SYSTOLIC BLOOD PRESSURE: 103 MMHG | BODY MASS INDEX: 24.5 KG/M2 | WEIGHT: 175 LBS | OXYGEN SATURATION: 96 % | HEART RATE: 71 BPM | DIASTOLIC BLOOD PRESSURE: 66 MMHG | HEIGHT: 71 IN | TEMPERATURE: 98.1 F | RESPIRATION RATE: 20 BRPM

## 2024-05-22 DIAGNOSIS — J02.9 SORE THROAT: ICD-10-CM

## 2024-05-22 LAB — GROUP A STREP BY PCR: NOT DETECTED

## 2024-05-22 PROCEDURE — 99283 EMERGENCY DEPT VISIT LOW MDM: CPT | Performed by: STUDENT IN AN ORGANIZED HEALTH CARE EDUCATION/TRAINING PROGRAM

## 2024-05-22 PROCEDURE — 87651 STREP A DNA AMP PROBE: CPT | Performed by: STUDENT IN AN ORGANIZED HEALTH CARE EDUCATION/TRAINING PROGRAM

## 2024-05-22 ASSESSMENT — COLUMBIA-SUICIDE SEVERITY RATING SCALE - C-SSRS
1. IN THE PAST MONTH, HAVE YOU WISHED YOU WERE DEAD OR WISHED YOU COULD GO TO SLEEP AND NOT WAKE UP?: NO
2. HAVE YOU ACTUALLY HAD ANY THOUGHTS OF KILLING YOURSELF IN THE PAST MONTH?: NO
6. HAVE YOU EVER DONE ANYTHING, STARTED TO DO ANYTHING, OR PREPARED TO DO ANYTHING TO END YOUR LIFE?: NO

## 2024-05-22 ASSESSMENT — ACTIVITIES OF DAILY LIVING (ADL): ADLS_ACUITY_SCORE: 33

## 2024-05-22 NOTE — DISCHARGE INSTRUCTIONS
Negative strep throat test today. Recommend trying ibuprofen (taken with food to avoid stomach ulcers). Suspect this will resolve over the upcoming several days. Recommend PCP follow up if not improving.     Please review attached instructions including reasons to return to the emergency department, including significantly worsening symptoms.

## 2024-05-22 NOTE — ED PROVIDER NOTES
History     Chief Complaint   Patient presents with    Pharyngitis       Bernabe Garnica is a 48 year old male who presents with pharyngitis.  Onset 3 days ago and associated with itchy eyes.  He thought this was allergies and took allergy medication with improvement of his eye symptoms, however sore throat persisted.  Sore throat tends to be worse at bedtime, noting that it does not worsen throughout the night.  Denies any other symptoms including fever, rhinorrhea, headache, body aches, cough, nausea/vomiting, dysphagia.  History includes strep throat.  No known sick contacts.  History does include GERD and bulimia.  He does have some soreness on his right side of his neck and feels like the right side of his throat is more affected.     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 No pertinent past medical history.    History reviewed. No pertinent surgical history.    History reviewed. No pertinent family history.    Social History     Tobacco Use    Smoking status: Former    Smokeless tobacco: Never   Vaping Use    Vaping status: Never Used   Substance Use Topics    Alcohol use: Yes     Comment: less than weekly    Drug use: Never       Medications:    omeprazole (PRILOSEC) 40 MG DR capsule        Review of Systems: See HPI for pertinent negatives and positives. All other systems reviewed and found to be negative.    Physical Exam   /66   Pulse 71   " Temp 98.1  F (36.7  C) (Tympanic)   Resp 20   Ht 1.803 m (5' 11\")   Wt 79.4 kg (175 lb)   SpO2 96%   BMI 24.41 kg/m       General: awake, comfortable, well appearing   HEENT: atraumatic, posterior adenopathy greater on the right, no uvula deviation, mild anterior cervical adenopathy, no exudates  Respiratory: normal effort, clear to auscultation bilaterally  Cardiovascular: regular rate and rhythm, no murmurs  Extremities: no deformities, edema, or tenderness  Skin: warm, dry, no rashes  Neuro: alert, no focal deficits  Psych: appropriate mood and affect    ED Course      Results for orders placed or performed during the hospital encounter of 05/22/24 (from the past 24 hour(s))   Group A Streptococcus PCR Throat Swab    Specimen: Throat; Swab   Result Value Ref Range    Group A strep by PCR Not Detected Not Detected    Narrative    The Xpert Xpress Strep A test, performed on the [employer], is a rapid, qualitative in vitro diagnostic test for the detection of Streptococcus pyogenes (Group A ß-hemolytic Streptococcus, Strep A) in throat swab specimens from patients with signs and symptoms of pharyngitis. The Xpert Xpress Strep A test can be used as an aid in the diagnosis of Group A Streptococcal pharyngitis. The assay is not intended to monitor treatment for Group A Streptococcus infections. The Xpert Xpress Strep A test utilizes an automated real-time polymerase chain reaction (PCR) to detect Streptococcus pyogenes DNA.       Medications - No data to display    Assessments & Plan (with Medical Decision Making)     I have reviewed the nursing notes.          48 year old male evaluated for sore throat. Evaluation remarkable for mild right anterior cervical adenopathy and enlarged tonsils right greater than left. Afebrile. Well appearing. No signs of peritonsillar abscess.  Negative strep throat. With itchy eyes this could represent allergic versus viral etiology. At this point do not see " signs of bacterial infection. Symptomatic treatment recommended. Discharged home with below plan and attached instructions on diagnosis provided including ED return precautions.     I have reviewed the findings, diagnosis, plan, and need for any follow up with the patient.    Patient instructions:   Negative strep throat test today. Recommend trying ibuprofen (taken with food to avoid stomach ulcers). Suspect this will resolve over the upcoming several days. Recommend PCP follow up if not improving.     Please review attached instructions including reasons to return to the emergency department, including significantly worsening symptoms.    Discharge Medication List as of 5/22/2024  6:10 PM          Final diagnoses:   Sore throat       5/22/2024   [facility]     Kip Bull MD  05/22/24 1925

## 2024-05-22 NOTE — ED TRIAGE NOTES
"ED Nursing Triage Note (General)   ________________________________    Benrabe Garnica is a 48 year old Male that presents to triage via pirvate vehicle with complaints of pharyngitis that started a couple days ago.  Patient states he initially attributed it to allergies, however, states sx are getting worse.  Patient states hx of strep and states he feels similar to this.  Afebrile on arrival, no home fevers reported.   Significant symptoms had onset 3 hour(s) ago.  Vital signs:  Temp: 98.1  F (36.7  C) Temp src: Tympanic BP: 103/66 Pulse: 71   Resp: 20 SpO2: 96 %     Height: 180.3 cm (5' 11\") Weight: 79.4 kg (175 lb)  Estimated body mass index is 24.41 kg/m  as calculated from the following:    Height as of this encounter: 1.803 m (5' 11\").    Weight as of this encounter: 79.4 kg (175 lb).  PRE HOSPITAL PRIOR LIVING SITUATION Alone      Triage Assessment (Adult)       Row Name 05/22/24 4846          Triage Assessment    Airway WDL WDL        Respiratory WDL    Respiratory WDL WDL        Skin Circulation/Temperature WDL    Skin Circulation/Temperature WDL WDL        Cardiac WDL    Cardiac WDL WDL     Cardiac Rhythm NSR        Peripheral/Neurovascular WDL    Peripheral Neurovascular WDL WDL        Cognitive/Neuro/Behavioral WDL    Cognitive/Neuro/Behavioral WDL WDL                     "

## 2025-03-17 ENCOUNTER — HOSPITAL ENCOUNTER (EMERGENCY)
Facility: HOSPITAL | Age: 50
Discharge: HOME | End: 2025-03-17
Attending: PHYSICIAN ASSISTANT
Payer: COMMERCIAL

## 2025-03-17 VITALS
SYSTOLIC BLOOD PRESSURE: 130 MMHG | OXYGEN SATURATION: 96 % | TEMPERATURE: 97.5 F | RESPIRATION RATE: 20 BRPM | DIASTOLIC BLOOD PRESSURE: 77 MMHG | HEART RATE: 61 BPM

## 2025-03-17 DIAGNOSIS — J01.00 ACUTE NON-RECURRENT MAXILLARY SINUSITIS: ICD-10-CM

## 2025-03-17 PROCEDURE — G0463 HOSPITAL OUTPT CLINIC VISIT: HCPCS

## 2025-03-17 PROCEDURE — 99213 OFFICE O/P EST LOW 20 MIN: CPT | Performed by: PHYSICIAN ASSISTANT

## 2025-03-17 ASSESSMENT — ACTIVITIES OF DAILY LIVING (ADL): ADLS_ACUITY_SCORE: 41

## 2025-03-17 NOTE — ED TRIAGE NOTES
Pt presents with c/o having increased cough, congestion, sinus pain and pressure and a mild sore throat   [facility] and [facility] came [facility] negative   Pt reports s.x started last week  Pt had dayquil this morning at 0630

## 2025-03-17 NOTE — ED PROVIDER NOTES
History     Chief Complaint   Patient presents with    Sinusitis     HPI  Bernabe Garnica is a 49 year old male who presents for sinus pain pressure congestion fevers chills body aches.  [facility] was [facility] and tested for COVID RSV influenza and strep and they were all negative.  Patient states symptoms of gotten worse since then.  Patient states mostly concerned for sinus pain and pressure.  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    Past Surgical History:    No past surgical history on file.    Family History:    No family history on file.    Social History:  Marital Status:   [4]  Social History     Tobacco Use    Smoking status: Former    Smokeless tobacco: Never   Vaping Use    Vaping status: Never Used   Substance Use Topics    Alcohol use: Yes     Comment: less than weekly    Drug use: Never        Medications:    amoxicillin-clavulanate (AUGMENTIN) 875-125 MG tablet  omeprazole (PRILOSEC) 40 MG DR capsule          Review of Systems   All other systems reviewed and are negative.      Physical Exam   BP: 130/77  Pulse: 61  Temp: 97.5  F (36.4  C)  Resp: 20  SpO2: 96 %      Physical Exam  Constitutional:       General: He is not in acute distress.     Appearance: Normal appearance. He is normal weight. He is not  ill-appearing, toxic-appearing or diaphoretic.   HENT:      Head: Normocephalic and atraumatic.      Right Ear: External ear normal.      Left Ear: External ear normal.      Nose: Congestion and rhinorrhea present.      Right Sinus: Maxillary sinus tenderness and frontal sinus tenderness present.      Left Sinus: Maxillary sinus tenderness and frontal sinus tenderness present.   Eyes:      Extraocular Movements: Extraocular movements intact.      Conjunctiva/sclera: Conjunctivae normal.      Pupils: Pupils are equal, round, and reactive to light.   Cardiovascular:      Rate and Rhythm: Normal rate.   Pulmonary:      Effort: Pulmonary effort is normal. No respiratory distress.   Musculoskeletal:         General: Normal range of motion.   Skin:     General: Skin is warm and dry.      Coloration: Skin is not jaundiced or pale.   Neurological:      Mental Status: He is alert and oriented to person, place, and time. Mental status is at baseline.      Cranial Nerves: No cranial nerve deficit.   Psychiatric:         Mood and Affect: Mood normal.         ED Course      Will treat the patient for acute sinusitis.  Discussed symptomatic management and follow-up with her primary care provider as needed.  Started on Augmentin  Procedures              No results found for this or any previous visit (from the past 24 hours).    Medications - No data to display    Assessments & Plan (with Medical Decision Making)     I have reviewed the nursing notes.    I have reviewed the findings, diagnosis, plan and need for follow up with the patient.          New Prescriptions    AMOXICILLIN-CLAVULANATE (AUGMENTIN) 875-125 MG TABLET    Take 1 tablet by mouth 2 times daily for 10 days.       Final diagnoses:   Acute non-recurrent maxillary sinusitis       3/17/2025   [facility]       Sai Hatfield PA-C  03/18/25 1533

## 2025-05-03 ENCOUNTER — HOSPITAL ENCOUNTER (EMERGENCY)
Facility: HOSPITAL | Age: 50
Discharge: HOME | End: 2025-05-03
Attending: PHYSICIAN ASSISTANT
Payer: COMMERCIAL

## 2025-05-03 VITALS
HEART RATE: 65 BPM | OXYGEN SATURATION: 96 % | TEMPERATURE: 97.4 F | RESPIRATION RATE: 18 BRPM | SYSTOLIC BLOOD PRESSURE: 120 MMHG | DIASTOLIC BLOOD PRESSURE: 70 MMHG

## 2025-05-03 DIAGNOSIS — S10.96XA TICK BITE OF NECK, INITIAL ENCOUNTER: ICD-10-CM
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
## 2025-05-03 PROCEDURE — 250N000013 HC RX MED GY IP 250 OP 250 PS 637

## 2025-05-03 PROCEDURE — G0463 HOSPITAL OUTPT CLINIC VISIT: HCPCS

## 2025-05-03 PROCEDURE — 99213 OFFICE O/P EST LOW 20 MIN: CPT

## 2025-05-03 RX ORDER — DOXYCYCLINE 100 MG/1
200 CAPSULE ORAL ONCE
Status: COMPLETED | OUTPATIENT
Start: 2025-05-03 | End: 2025-05-03

## 2025-05-03 RX ADMIN — DOXYCYCLINE HYCLATE 200 MG: 100 CAPSULE ORAL at 09:31

## 2025-05-03 ASSESSMENT — ENCOUNTER SYMPTOMS
APPETITE CHANGE: 0
ACTIVITY CHANGE: 0
MYALGIAS: 0
FEVER: 0
ARTHRALGIAS: 0
WOUND: 1

## 2025-05-03 ASSESSMENT — COLUMBIA-SUICIDE SEVERITY RATING SCALE - C-SSRS
1. IN THE PAST MONTH, HAVE YOU WISHED YOU WERE DEAD OR WISHED YOU COULD GO TO SLEEP AND NOT WAKE UP?: NO
6. HAVE YOU EVER DONE ANYTHING, STARTED TO DO ANYTHING, OR PREPARED TO DO ANYTHING TO END YOUR LIFE?: NO
2. HAVE YOU ACTUALLY HAD ANY THOUGHTS OF KILLING YOURSELF IN THE PAST MONTH?: NO

## 2025-05-03 ASSESSMENT — ACTIVITIES OF DAILY LIVING (ADL): ADLS_ACUITY_SCORE: 41

## 2025-05-03 NOTE — ED PROVIDER NOTES
History     Chief Complaint   Patient presents with    Insect Bite     HPI  Bernabe Garnica is a 49 year old male who presents to the urgent care after removing a deer tick from left side of neck this morning. Unsure how long it was in place. Was out in the woods yesterday. He denies fevers, chills, and feeling unwell.     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    Past Surgical History:    No past surgical history on file.    Family History:    No family history on file.    Social History:  Marital Status:   [4]  Social History     Tobacco Use    Smoking status: Former    Smokeless tobacco: Never   Vaping Use    Vaping status: Never Used   Substance Use Topics    Alcohol use: Yes     Comment: less than weekly    Drug use: Never        Medications:    omeprazole (PRILOSEC) 40 MG DR capsule          Review of Systems   Constitutional:  Negative for activity change, appetite change and fever.   Musculoskeletal:  Negative for arthralgias and myalgias.   Skin:  Positive for wound.   All other systems reviewed and are negative.      Physical Exam   BP: 120/70  Pulse: 65  Temp: 97.4  F (36.3  C)  Resp: 18  SpO2: 96 %      Physical Exam  Vitals and nursing note reviewed.   Constitutional:       General: He is not in acute distress.     Appearance: Normal appearance. He is not  ill-appearing or toxic-appearing.   HENT:      Mouth/Throat:      Mouth: Mucous membranes are moist.      Pharynx: Oropharynx is clear. No oropharyngeal exudate or posterior oropharyngeal erythema.   Cardiovascular:      Rate and Rhythm: Normal rate and regular rhythm.      Pulses: Normal pulses.      Heart sounds: Normal heart sounds. No murmur heard.  Pulmonary:      Effort: Pulmonary effort is normal.      Breath sounds: Normal breath sounds. No wheezing, rhonchi or rales.   Skin:     Findings: Wound present.          Neurological:      Mental Status: He is alert.         ED Course        Procedures       No results found for this or any previous visit (from the past 24 hours).    Medications   doxycycline hyclate (VIBRAMYCIN) capsule 200 mg (has no administration in time range)       Assessments & Plan (with Medical Decision Making)     I have reviewed the nursing notes.    I have reviewed the findings, diagnosis, plan and need for follow up with the patient.  Bernabe Garnica is a 49 year old male who presents to the urgent care after removing a deer tick from left side of neck this morning. Unsure how long it was in place. Was out in the woods yesterday. He denies fevers, chills, and feeling unwell.     MDM: vital signs normal, afebrile. Non toxic in appearance with no noted distress. Lungs clear, heart tones regular. <5mm area of redness to left lateral neck where tick was removed. No erythema migrans. No palpable fluctuance. No purulent drainage. Tick does appear to be a deer tick, mildly engorged. Removed this morning. Meets criteria for one time prophylaxis dose of doxycycline, administered in the . Supportive measures and return precautions discussed. He is in agreement with plan.     (S10.96XA,  W57.XXXA) Tick bite of neck, initial encounter  Plan: Keep wound clean.   Return with any rashes, body aches, fevers, or other concerns.  Follow up in the clinic as needed. Understanding verbalized.        New  Prescriptions    No medications on file       Final diagnoses:   Tick bite of neck, initial encounter       5/3/2025   [facility]       Katya Ng NP  05/03/25 0962

## 2025-05-03 NOTE — DISCHARGE INSTRUCTIONS
Keep wound clean.   Return with any rashes, body aches, fevers, or other concerns.  Follow up in the clinic as needed.